# Patient Record
Sex: MALE | Race: WHITE | NOT HISPANIC OR LATINO | URBAN - METROPOLITAN AREA
[De-identification: names, ages, dates, MRNs, and addresses within clinical notes are randomized per-mention and may not be internally consistent; named-entity substitution may affect disease eponyms.]

---
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
## 2024-08-19 NOTE — ED PEDIATRIC NURSE NOTE - OBJECTIVE STATEMENT
Addended by: CIARAN BENSON on: 8/19/2024 03:08 PM     Modules accepted: Orders     Patient came from [facility] with left facial steristrips in place. Patient was playing basketball when he got hit in the face by another player's head. laceration present. Denies LOC or anticoagulant use. No other pmh. Denies vomiting afterwards.

## 2025-01-16 ENCOUNTER — NON-APPOINTMENT (OUTPATIENT)
Age: 20
End: 2025-01-16

## 2025-01-22 ENCOUNTER — APPOINTMENT (OUTPATIENT)
Dept: ORTHOPEDIC SURGERY | Facility: CLINIC | Age: 20
End: 2025-01-22